# Patient Record
Sex: FEMALE | URBAN - METROPOLITAN AREA
[De-identification: names, ages, dates, MRNs, and addresses within clinical notes are randomized per-mention and may not be internally consistent; named-entity substitution may affect disease eponyms.]

---

## 2021-10-15 ENCOUNTER — OFFICE (OUTPATIENT)
Dept: URBAN - METROPOLITAN AREA CLINIC 19 | Facility: CLINIC | Age: 41
End: 2021-10-15

## 2023-12-07 ENCOUNTER — OFFICE (OUTPATIENT)
Dept: URBAN - METROPOLITAN AREA CLINIC 19 | Facility: CLINIC | Age: 43
End: 2023-12-07

## 2023-12-07 VITALS
OXYGEN SATURATION: 99 % | HEIGHT: 60 IN | DIASTOLIC BLOOD PRESSURE: 69 MMHG | SYSTOLIC BLOOD PRESSURE: 106 MMHG | WEIGHT: 150 LBS | HEART RATE: 79 BPM

## 2023-12-07 DIAGNOSIS — D50.9 IRON DEFICIENCY ANEMIA, UNSPECIFIED: ICD-10-CM

## 2023-12-07 DIAGNOSIS — R14.0 ABDOMINAL DISTENSION (GASEOUS): ICD-10-CM

## 2023-12-07 DIAGNOSIS — K21.9 GASTRO-ESOPHAGEAL REFLUX DISEASE WITHOUT ESOPHAGITIS: ICD-10-CM

## 2023-12-07 DIAGNOSIS — R07.2 PRECORDIAL PAIN: ICD-10-CM

## 2023-12-07 PROCEDURE — 99204 OFFICE O/P NEW MOD 45 MIN: CPT | Performed by: INTERNAL MEDICINE

## 2023-12-07 RX ORDER — POLYETHYLENE GLYCOL 3350, SODIUM SULFATE, SODIUM CHLORIDE, POTASSIUM CHLORIDE, ASCORBIC ACID, SODIUM ASCORBATE 140-9-5.2G
KIT ORAL
Qty: 1 | Refills: 0 | Status: ACTIVE
Start: 2023-12-07

## 2023-12-07 NOTE — SERVICEHPINOTES
42-year-old female reports being diagnosed with iron deficiency anemia for a long time.  Denies any hematemesis or melena or hematochezia.  Reports heavy menstrual cycles.  Reports that blood test as well as genetic test was positive for celiac.  She is currently on 100% gluten free diet.  Never had an EGD.  Reports receiving IV iron in the past.  Reports that she reacted to both Injectafer for as well as Venofer.  Last IV iron was in March 2023. Reports intermittent discomfort in the retrosternal area which she feels is a spasm.  Occasional reflux symptoms.  Takes ibuprofen on an as-needed basis.  Bowel movements are regular.  Labs done outside in April 2023 revealed normal CMP.  Hemoglobin was 11.8, hematocrit 38.8 and MCV 79.2.  Iron saturation was 8 and ferritin was 41 in February 2023.  She has never had an EGD or colonoscopy.  Paternal cousin with colon cancer.  No cardiac issues and not on any blood thinners or diet pills.